# Patient Record
Sex: FEMALE | Race: WHITE | NOT HISPANIC OR LATINO | URBAN - METROPOLITAN AREA
[De-identification: names, ages, dates, MRNs, and addresses within clinical notes are randomized per-mention and may not be internally consistent; named-entity substitution may affect disease eponyms.]

---

## 2024-06-05 ENCOUNTER — TELEPHONE (OUTPATIENT)
Age: 64
End: 2024-06-05

## 2024-06-05 NOTE — TELEPHONE ENCOUNTER
[facility] received prescription for Orencia click for 28 days and the patient is wanting a 90 day supply.

## 2024-06-14 RX ORDER — ABATACEPT 125 MG/ML
125 INJECTION, SOLUTION SUBCUTANEOUS WEEKLY
Qty: 12 EACH | Refills: 4 | Status: SHIPPED | OUTPATIENT
Start: 2024-06-14

## 2024-07-03 ENCOUNTER — SPECIALTY PHARMACY (OUTPATIENT)
Age: 64
End: 2024-07-03
Payer: COMMERCIAL

## 2024-07-03 PROBLEM — R53.83 FATIGUE: Status: ACTIVE | Noted: 2024-07-03

## 2024-07-03 PROBLEM — Z79.899 ENCOUNTER FOR LONG-TERM (CURRENT) USE OF HIGH-RISK MEDICATION: Status: ACTIVE | Noted: 2024-07-03

## 2024-07-03 PROBLEM — D84.821 IMMUNODEFICIENCY DUE TO TREATMENT WITH IMMUNOSUPPRESSIVE MEDICATION: Status: ACTIVE | Noted: 2024-07-03

## 2024-07-03 PROBLEM — M05.9 RHEUMATOID ARTHRITIS WITH POSITIVE RHEUMATOID FACTOR: Status: ACTIVE | Noted: 2024-07-03

## 2024-07-03 PROBLEM — Z79.899 IMMUNODEFICIENCY DUE TO TREATMENT WITH IMMUNOSUPPRESSIVE MEDICATION: Status: ACTIVE | Noted: 2024-07-03

## 2024-07-03 PROBLEM — G56.01 CARPAL TUNNEL SYNDROME, RIGHT: Status: ACTIVE | Noted: 2024-07-03

## 2024-07-03 NOTE — ASSESSMENT & PLAN NOTE
Continue MTX 6 tablets weekly  Continue every 6 week labs  No live vaccines on methotrexate  Methotrexate should be held for any illness or infection.  Medication can be resumed when illness has resolved.  Methotrexate should be held perioperatively.

## 2024-07-03 NOTE — PROGRESS NOTES
Office Visit       Date: 07/05/2024   Patient Name: Yoli Woods  MRN: [id number]  YOB: 1960    Referring Physician: Blanca Michaels, *     Chief Complaint:   Chief Complaint   Patient presents with    Rheumatoid Arthritis       History of Present Illness: Yoli Woods is a 63 y.o. female who is here today for follow-up of rheumatoid arthritis.    She tried Humira 12/23 - 3/24.  It was not helpful.   She has been on Orencia injections for about 2 months.  She reports improvement since starting this medication.  No issues with injection site reactions.  No recent infections.  No SE's with MTX.  She is taking 6 tablets weekly.    Subjective   Review of systems:  Review of Systems   Constitutional:  Positive for diaphoresis.   Gastrointestinal:  Positive for abdominal distention.   Endocrine:        Hot flashes   Musculoskeletal:  Positive for arthralgias and joint swelling.   Hematological:  Bruises/bleeds easily.   Psychiatric/Behavioral:  Positive for sleep disturbance.    All other systems reviewed and are negative.      Past Medical History: History reviewed. No pertinent past medical history.    Past Surgical History: History reviewed. No pertinent surgical history.    Family History:   Family History   Problem Relation Age of Onset    Rheum arthritis Father        Social History:   Social History     Socioeconomic History    Marital status:    Tobacco Use    Smoking status: Former     Types: Cigarettes    Smokeless tobacco: Never   Vaping Use    Vaping status: Never Used   Substance and Sexual Activity    Alcohol use: Never    Drug use: Never    Sexual activity: Defer       Medications:   Current Outpatient Medications:     Abatacept (Orencia ClickJect) 125 MG/ML solution auto-injector, Inject 1 mL under the skin into the appropriate area as directed 1 (One) Time Per Week. Inject 1 mL by subcutaneous route every week, Disp: 12 each, Rfl: 0    folic acid (FOLVITE)  "1 MG tablet, Take 2 tablets by mouth Daily., Disp: 90 tablet, Rfl: 1    Magnesium Oxide -Mg Supplement 400 (240 Mg) MG tablet, Take 1 tablet by mouth Daily., Disp: , Rfl:     methotrexate 2.5 MG tablet, Take 6 tablets by mouth 1 (One) Time Per Week., Disp: 30 tablet, Rfl: 2    naproxen sodium (ALEVE) 220 MG tablet, Take 1 tablet by mouth Daily., Disp: , Rfl:     Allergies: No Known Allergies    I reviewed the patient's chief complaint, history of present illness, review of systems, past medical history, surgical history, family history, social history, medications and allergy list.     Objective    Vital Signs:   Vitals:    07/05/24 1029   BP: 130/84   BP Location: Left arm   Patient Position: Sitting   Cuff Size: Adult   Pulse: 64   Temp: 97.4 °F (36.3 °C)   Weight: 62.5 kg (137 lb 12.8 oz)   Height: 157.5 cm (62\")   PainSc:   2   PainLoc: Generalized     Body mass index is 25.2 kg/m².   BMI cannot be calculated due to outdated height or weight values.  Please input a current height/weight in Vitals and re-renter BMIFOLLOWUP in Note to pull in correct documentation based on BMI range.       Physical Exam:  Physical Exam  Constitutional:       Appearance: Normal appearance.   HENT:      Head: Normocephalic and atraumatic.   Eyes:      Conjunctiva/sclera: Conjunctivae normal.      Pupils: Pupils are equal, round, and reactive to light.   Cardiovascular:      Rate and Rhythm: Normal rate and regular rhythm.      Heart sounds: Normal heart sounds.   Pulmonary:      Effort: Pulmonary effort is normal.      Breath sounds: Normal breath sounds.   Musculoskeletal:         General: Normal range of motion.      Cervical back: Normal range of motion.      Comments: Limited range of motion right elbow  Mild squaring CMC joints bilaterally  Heberden's and Marlo's nodes present   Skin:     General: Skin is warm and dry.   Neurological:      General: No focal deficit present.      Mental Status: She is alert and oriented to " person, place, and time.   Psychiatric:         Mood and Affect: Mood normal.         Behavior: Behavior normal.         Thought Content: Thought content normal.         Judgment: Judgment normal.          Metrics  SLAUGHTER-28 (ESR): 2.83 (Low disease activity)  SLAUGHTER-28 (CRP): 1.82 (Remission)  Tender (SLAUGHTER-28): 0 / 28   Swollen (SLAUGHTER-28): 0 / 28     This Exam  Provider Global: 1 mm  Patient Global: 2 mm  ESR: 55 mm/hr  CRP: 9 mg/L        Results Review:   Imaging Results (Last 24 Hours)       ** No results found for the last 24 hours. **            Assessment / Plan    Assessment/Plan:   Diagnoses and all orders for this visit:    1. Rheumatoid arthritis with positive rheumatoid factor, involving unspecified site (Primary)  Assessment & Plan:  Onset 2022.  Rheumatoid factor positive 351 with normal being less than 14. Elevated CRP and sed rate. CCP high +.   Associated right carpal tunnel syndrome.  X-ray 7/23 showed no erosions.  Humira started Late 12/23 (no help- stopped 3/2024)  Orencia started 5/2024    Tender joint count is 0, swollen joint count is 0, physician global is 1, visual analog pain scale is 1.5, pt global is 2  CDAI is 3 -low disease activity.   Continue Orencia injections weekly.  She reports improvement since starting the medication 2 months ago.  Continue MTX 6 tablets weekly.   Labs today and every 6 weeks.   Recheck in 3 months    Orders:  -     Comprehensive Metabolic Panel; Standing  -     C-reactive Protein; Standing  -     Sedimentation Rate; Standing  -     CBC Auto Differential; Standing  -     methotrexate 2.5 MG tablet; Take 6 tablets by mouth 1 (One) Time Per Week.  Dispense: 30 tablet; Refill: 2  -     folic acid (FOLVITE) 1 MG tablet; Take 2 tablets by mouth Daily.  Dispense: 90 tablet; Refill: 1  -     Abatacept (Orencia ClickJect) 125 MG/ML solution auto-injector; Inject 1 mL under the skin into the appropriate area as directed 1 (One) Time Per Week. Inject 1 mL by subcutaneous route  every week  Dispense: 12 each; Refill: 0    2. Immunodeficiency due to treatment with immunosuppressive medication  Assessment & Plan:  No recent infections. Orencia well tolerated.  Reminded her to hold Orencia with any infection or illness.  Orencia should also be held perioperatively.  No live vaccines on this medication.    Negative hepatitis panel 7/27/2023  Negative PPD 11/9/2023    We discussed biologic agents at length. Risks and alternatives were discussed at length and the option of no treatment was also given. We discussed risks including but not limited to infections which can be unusual, severe, and deadly. When possible, these agents should be stopped immediately if infections occur. Unusual infection such as TB and fungal infections can occur. There may be an increased risk of lymphoma with these agents. Other risks can include a multiple sclerosis-like illness and worsening of heart failure. Infusion or injection reactions which can be deadly have been reported. Studies on pregnancy have not been done so pregnancy should be avoided while on these agents. Reactivation of a deadly brain virus and hepatitis viruses have been reported. Worsening of COPD has been seen with Orencia. Elevated lipids, elevation in liver functions, and dangerous changes in blood counts have been seen with certain agents. Regular monitoring will be required.    Orders:  -     Comprehensive Metabolic Panel; Standing  -     C-reactive Protein; Standing  -     Sedimentation Rate; Standing  -     CBC Auto Differential; Standing  -     Abatacept (Orencia ClickJect) 125 MG/ML solution auto-injector; Inject 1 mL under the skin into the appropriate area as directed 1 (One) Time Per Week. Inject 1 mL by subcutaneous route every week  Dispense: 12 each; Refill: 0    3. Encounter for long-term (current) use of high-risk medication  Assessment & Plan:  Continue MTX 6 tablets weekly  Continue every 6 week labs  No live vaccines on  methotrexate  Methotrexate should be held for any illness or infection.  Medication can be resumed when illness has resolved.  Methotrexate should be held perioperatively.        Orders:  -     Comprehensive Metabolic Panel; Standing  -     C-reactive Protein; Standing  -     Sedimentation Rate; Standing  -     CBC Auto Differential; Standing  -     methotrexate 2.5 MG tablet; Take 6 tablets by mouth 1 (One) Time Per Week.  Dispense: 30 tablet; Refill: 2  -     folic acid (FOLVITE) 1 MG tablet; Take 2 tablets by mouth Daily.  Dispense: 90 tablet; Refill: 1    4. Fatigue, unspecified type  Assessment & Plan:  Stable      5. Carpal tunnel syndrome, right  Assessment & Plan:  Overall improved          Follow Up:   Return in about 3 months (around 10/5/2024) for Dr. Lal.        KENDELL Suarez   Rheumatology [facility]

## 2024-07-03 NOTE — ASSESSMENT & PLAN NOTE
No recent infections. Orencia well tolerated.  Reminded her to hold Orencia with any infection or illness.  Orencia should also be held perioperatively.  No live vaccines on this medication.    Negative hepatitis panel 7/27/2023  Negative PPD 11/9/2023    We discussed biologic agents at length. Risks and alternatives were discussed at length and the option of no treatment was also given. We discussed risks including but not limited to infections which can be unusual, severe, and deadly. When possible, these agents should be stopped immediately if infections occur. Unusual infection such as TB and fungal infections can occur. There may be an increased risk of lymphoma with these agents. Other risks can include a multiple sclerosis-like illness and worsening of heart failure. Infusion or injection reactions which can be deadly have been reported. Studies on pregnancy have not been done so pregnancy should be avoided while on these agents. Reactivation of a deadly brain virus and hepatitis viruses have been reported. Worsening of COPD has been seen with Orencia. Elevated lipids, elevation in liver functions, and dangerous changes in blood counts have been seen with certain agents. Regular monitoring will be required.

## 2024-07-03 NOTE — ASSESSMENT & PLAN NOTE
Onset 2022.  Rheumatoid factor positive 351 with normal being less than 14. Elevated CRP and sed rate. CCP high +.   Associated right carpal tunnel syndrome.  X-ray 7/23 showed no erosions.  Humira started Late 12/23 (no help- stopped 3/2024)  Orencia started 5/2024    Tender joint count is 0, swollen joint count is 0, physician global is 1, visual analog pain scale is 1.5, pt global is 2  CDAI is 3 -low disease activity.   Continue Orencia injections weekly.  She reports improvement since starting the medication 2 months ago.  Continue MTX 6 tablets weekly.   Labs today and every 6 weeks.   Recheck in 3 months

## 2024-07-05 ENCOUNTER — LAB (OUTPATIENT)
Facility: HOSPITAL | Age: 64
End: 2024-07-05
Payer: COMMERCIAL

## 2024-07-05 ENCOUNTER — OFFICE VISIT (OUTPATIENT)
Age: 64
End: 2024-07-05
Payer: COMMERCIAL

## 2024-07-05 VITALS
HEIGHT: 62 IN | BODY MASS INDEX: 25.36 KG/M2 | DIASTOLIC BLOOD PRESSURE: 84 MMHG | HEART RATE: 64 BPM | SYSTOLIC BLOOD PRESSURE: 130 MMHG | WEIGHT: 137.8 LBS | TEMPERATURE: 97.4 F

## 2024-07-05 DIAGNOSIS — M05.9 RHEUMATOID ARTHRITIS WITH POSITIVE RHEUMATOID FACTOR, INVOLVING UNSPECIFIED SITE: Primary | ICD-10-CM

## 2024-07-05 DIAGNOSIS — M05.9 RHEUMATOID ARTHRITIS WITH POSITIVE RHEUMATOID FACTOR, INVOLVING UNSPECIFIED SITE: ICD-10-CM

## 2024-07-05 DIAGNOSIS — Z79.899 ENCOUNTER FOR LONG-TERM (CURRENT) USE OF HIGH-RISK MEDICATION: ICD-10-CM

## 2024-07-05 DIAGNOSIS — Z79.899 IMMUNODEFICIENCY DUE TO TREATMENT WITH IMMUNOSUPPRESSIVE MEDICATION: ICD-10-CM

## 2024-07-05 DIAGNOSIS — D84.821 IMMUNODEFICIENCY DUE TO TREATMENT WITH IMMUNOSUPPRESSIVE MEDICATION: ICD-10-CM

## 2024-07-05 DIAGNOSIS — G56.01 CARPAL TUNNEL SYNDROME, RIGHT: ICD-10-CM

## 2024-07-05 DIAGNOSIS — R53.83 FATIGUE, UNSPECIFIED TYPE: ICD-10-CM

## 2024-07-05 LAB
ALBUMIN SERPL-MCNC: 4.3 G/DL (ref 3.5–5.2)
ALBUMIN/GLOB SERPL: 1.5 G/DL
ALP SERPL-CCNC: 101 U/L (ref 39–117)
ALT SERPL W P-5'-P-CCNC: 13 U/L (ref 1–33)
ANION GAP SERPL CALCULATED.3IONS-SCNC: 9 MMOL/L (ref 5–15)
AST SERPL-CCNC: 10 U/L (ref 1–32)
BASOPHILS # BLD AUTO: 0.08 10*3/MM3 (ref 0–0.2)
BASOPHILS NFR BLD AUTO: 1.1 % (ref 0–1.5)
BILIRUB SERPL-MCNC: 0.3 MG/DL (ref 0–1.2)
BUN SERPL-MCNC: 15 MG/DL (ref 8–23)
BUN/CREAT SERPL: 27.3 (ref 7–25)
CALCIUM SPEC-SCNC: 9.9 MG/DL (ref 8.6–10.5)
CHLORIDE SERPL-SCNC: 106 MMOL/L (ref 98–107)
CO2 SERPL-SCNC: 26 MMOL/L (ref 22–29)
CREAT SERPL-MCNC: 0.55 MG/DL (ref 0.57–1)
CRP SERPL-MCNC: <0.3 MG/DL (ref 0–0.5)
DEPRECATED RDW RBC AUTO: 48.6 FL (ref 37–54)
EGFRCR SERPLBLD CKD-EPI 2021: 104.4 ML/MIN/1.73
EOSINOPHIL # BLD AUTO: 0.29 10*3/MM3 (ref 0–0.4)
EOSINOPHIL NFR BLD AUTO: 3.8 % (ref 0.3–6.2)
ERYTHROCYTE [DISTWIDTH] IN BLOOD BY AUTOMATED COUNT: 15.3 % (ref 12.3–15.4)
ERYTHROCYTE [SEDIMENTATION RATE] IN BLOOD: 14 MM/HR (ref 0–30)
GLOBULIN UR ELPH-MCNC: 2.8 GM/DL
GLUCOSE SERPL-MCNC: 94 MG/DL (ref 65–99)
HCT VFR BLD AUTO: 42.4 % (ref 34–46.6)
HGB BLD-MCNC: 13.5 G/DL (ref 12–15.9)
IMM GRANULOCYTES # BLD AUTO: 0.02 10*3/MM3 (ref 0–0.05)
IMM GRANULOCYTES NFR BLD AUTO: 0.3 % (ref 0–0.5)
LYMPHOCYTES # BLD AUTO: 2.56 10*3/MM3 (ref 0.7–3.1)
LYMPHOCYTES NFR BLD AUTO: 33.7 % (ref 19.6–45.3)
MCH RBC QN AUTO: 28 PG (ref 26.6–33)
MCHC RBC AUTO-ENTMCNC: 31.8 G/DL (ref 31.5–35.7)
MCV RBC AUTO: 87.8 FL (ref 79–97)
MONOCYTES # BLD AUTO: 0.64 10*3/MM3 (ref 0.1–0.9)
MONOCYTES NFR BLD AUTO: 8.4 % (ref 5–12)
NEUTROPHILS NFR BLD AUTO: 4.01 10*3/MM3 (ref 1.7–7)
NEUTROPHILS NFR BLD AUTO: 52.7 % (ref 42.7–76)
NRBC BLD AUTO-RTO: 0 /100 WBC (ref 0–0.2)
PLATELET # BLD AUTO: 406 10*3/MM3 (ref 140–450)
PMV BLD AUTO: 10.3 FL (ref 6–12)
POTASSIUM SERPL-SCNC: 4.6 MMOL/L (ref 3.5–5.2)
PROT SERPL-MCNC: 7.1 G/DL (ref 6–8.5)
RBC # BLD AUTO: 4.83 10*6/MM3 (ref 3.77–5.28)
SODIUM SERPL-SCNC: 141 MMOL/L (ref 136–145)
WBC NRBC COR # BLD AUTO: 7.6 10*3/MM3 (ref 3.4–10.8)

## 2024-07-05 PROCEDURE — 36415 COLL VENOUS BLD VENIPUNCTURE: CPT

## 2024-07-05 PROCEDURE — 80053 COMPREHEN METABOLIC PANEL: CPT

## 2024-07-05 PROCEDURE — 86140 C-REACTIVE PROTEIN: CPT

## 2024-07-05 PROCEDURE — 85652 RBC SED RATE AUTOMATED: CPT

## 2024-07-05 PROCEDURE — 85025 COMPLETE CBC W/AUTO DIFF WBC: CPT

## 2024-07-05 PROCEDURE — 99214 OFFICE O/P EST MOD 30 MIN: CPT | Performed by: NURSE PRACTITIONER

## 2024-07-05 RX ORDER — METHOTREXATE 2.5 MG/1
15 TABLET ORAL WEEKLY
Qty: 30 TABLET | Refills: 2 | Status: SHIPPED | OUTPATIENT
Start: 2024-07-05

## 2024-07-05 RX ORDER — LANOLIN ALCOHOL/MO/W.PET/CERES
400 CREAM (GRAM) TOPICAL DAILY
COMMUNITY

## 2024-07-05 RX ORDER — FOLIC ACID 1 MG/1
2000 TABLET ORAL DAILY
Qty: 90 TABLET | Refills: 1 | Status: SHIPPED | OUTPATIENT
Start: 2024-07-05

## 2024-07-05 RX ORDER — ABATACEPT 125 MG/ML
125 INJECTION, SOLUTION SUBCUTANEOUS WEEKLY
Qty: 12 EACH | Refills: 0 | Status: SHIPPED | OUTPATIENT
Start: 2024-07-05

## 2024-10-16 ENCOUNTER — TELEPHONE (OUTPATIENT)
Age: 64
End: 2024-10-16
Payer: COMMERCIAL

## 2024-11-18 DIAGNOSIS — M05.9 RHEUMATOID ARTHRITIS WITH POSITIVE RHEUMATOID FACTOR, INVOLVING UNSPECIFIED SITE: ICD-10-CM

## 2024-11-18 DIAGNOSIS — Z79.899 ENCOUNTER FOR LONG-TERM (CURRENT) USE OF HIGH-RISK MEDICATION: ICD-10-CM

## 2024-11-18 RX ORDER — FOLIC ACID 1 MG/1
2000 TABLET ORAL DAILY
Qty: 180 TABLET | Refills: 0 | Status: SHIPPED | OUTPATIENT
Start: 2024-11-18

## 2024-11-26 DIAGNOSIS — Z79.899 IMMUNODEFICIENCY DUE TO TREATMENT WITH IMMUNOSUPPRESSIVE MEDICATION: ICD-10-CM

## 2024-11-26 DIAGNOSIS — D84.821 IMMUNODEFICIENCY DUE TO TREATMENT WITH IMMUNOSUPPRESSIVE MEDICATION: ICD-10-CM

## 2024-11-26 DIAGNOSIS — M05.9 RHEUMATOID ARTHRITIS WITH POSITIVE RHEUMATOID FACTOR, INVOLVING UNSPECIFIED SITE: ICD-10-CM

## 2024-11-26 RX ORDER — ABATACEPT 125 MG/ML
INJECTION, SOLUTION SUBCUTANEOUS
Qty: 12 EACH | Refills: 0 | Status: SHIPPED | OUTPATIENT
Start: 2024-11-26

## 2024-11-26 NOTE — TELEPHONE ENCOUNTER
Specialty Pharmacy Patient Management Program  Per Protocol Prescription Order/Refill     Patient currently fills medications at Other and is not enrolled in an Rheumatology Patient Management Program.     Requested Prescriptions     Signed Prescriptions Disp Refills    Orencia ClickJect 125 MG/ML solution auto-injector 12 each 0     Sig: INJECT 1 PEN UNDER THE SKIN EVERY 7 DAYS     Authorizing Provider: NATON RICHARD     Ordering User: MONTRELL RESTREPO     Prescription orders above were sent to the pharmacy per Collaborative Care Agreement Protocol.

## 2024-12-10 DIAGNOSIS — Z79.899 ENCOUNTER FOR LONG-TERM (CURRENT) USE OF HIGH-RISK MEDICATION: ICD-10-CM

## 2024-12-10 DIAGNOSIS — M05.9 RHEUMATOID ARTHRITIS WITH POSITIVE RHEUMATOID FACTOR, INVOLVING UNSPECIFIED SITE: ICD-10-CM

## 2024-12-10 NOTE — TELEPHONE ENCOUNTER
MTX refill needed. [facility] pharmacy [place], [place]. Pt missed appt that was 10/08/24. Last labs completed 7/5/2024.

## 2024-12-11 RX ORDER — METHOTREXATE 2.5 MG/1
15 TABLET ORAL WEEKLY
Qty: 30 TABLET | Refills: 2 | OUTPATIENT
Start: 2024-12-11

## 2025-02-12 DIAGNOSIS — Z79.899 IMMUNODEFICIENCY DUE TO TREATMENT WITH IMMUNOSUPPRESSIVE MEDICATION: ICD-10-CM

## 2025-02-12 DIAGNOSIS — D84.821 IMMUNODEFICIENCY DUE TO TREATMENT WITH IMMUNOSUPPRESSIVE MEDICATION: ICD-10-CM

## 2025-02-12 DIAGNOSIS — M05.9 RHEUMATOID ARTHRITIS WITH POSITIVE RHEUMATOID FACTOR, INVOLVING UNSPECIFIED SITE: ICD-10-CM

## 2025-02-14 RX ORDER — ABATACEPT 125 MG/ML
INJECTION, SOLUTION SUBCUTANEOUS
Qty: 12 EACH | Refills: 0 | Status: SHIPPED | OUTPATIENT
Start: 2025-02-14